# Patient Record
Sex: FEMALE | Race: BLACK OR AFRICAN AMERICAN | NOT HISPANIC OR LATINO | URBAN - METROPOLITAN AREA
[De-identification: names, ages, dates, MRNs, and addresses within clinical notes are randomized per-mention and may not be internally consistent; named-entity substitution may affect disease eponyms.]

---

## 2023-12-20 ENCOUNTER — OFFICE VISIT (OUTPATIENT)
Dept: PRIMARY CARE | Facility: CLINIC | Age: 48
End: 2023-12-20
Payer: COMMERCIAL

## 2023-12-20 DIAGNOSIS — E66.3 OVERWEIGHT: ICD-10-CM

## 2023-12-20 DIAGNOSIS — G43.009 MIGRAINE WITHOUT AURA AND WITHOUT STATUS MIGRAINOSUS, NOT INTRACTABLE: ICD-10-CM

## 2023-12-20 DIAGNOSIS — F41.9 ANXIETY: ICD-10-CM

## 2023-12-20 DIAGNOSIS — F33.1 MODERATE EPISODE OF RECURRENT MAJOR DEPRESSIVE DISORDER (MULTI): ICD-10-CM

## 2023-12-20 DIAGNOSIS — F51.01 PRIMARY INSOMNIA: ICD-10-CM

## 2023-12-20 DIAGNOSIS — B37.31 VAGINAL CANDIDIASIS: Primary | ICD-10-CM

## 2023-12-20 DIAGNOSIS — J40 BRONCHITIS: ICD-10-CM

## 2023-12-20 DIAGNOSIS — J01.40 ACUTE NON-RECURRENT PANSINUSITIS: ICD-10-CM

## 2023-12-20 PROCEDURE — 1036F TOBACCO NON-USER: CPT | Performed by: INTERNAL MEDICINE

## 2023-12-20 PROCEDURE — 3008F BODY MASS INDEX DOCD: CPT | Performed by: INTERNAL MEDICINE

## 2023-12-20 PROCEDURE — 99212 OFFICE O/P EST SF 10 MIN: CPT | Performed by: INTERNAL MEDICINE

## 2023-12-20 RX ORDER — ATOGEPANT 60 MG/1
60 TABLET ORAL DAILY
Qty: 100 TABLET | Refills: 1 | Status: SHIPPED | OUTPATIENT
Start: 2023-12-20 | End: 2024-05-31 | Stop reason: SDUPTHER

## 2023-12-20 RX ORDER — BUPROPION HYDROCHLORIDE 300 MG/1
300 TABLET ORAL DAILY
COMMUNITY
End: 2023-12-20 | Stop reason: SDUPTHER

## 2023-12-20 RX ORDER — ATOGEPANT 60 MG/1
60 TABLET ORAL DAILY
COMMUNITY
End: 2023-12-20 | Stop reason: SDUPTHER

## 2023-12-20 RX ORDER — LEVOFLOXACIN 500 MG/1
500 TABLET, FILM COATED ORAL DAILY
Qty: 10 TABLET | Refills: 0 | Status: SHIPPED | OUTPATIENT
Start: 2023-12-20 | End: 2023-12-30

## 2023-12-20 RX ORDER — ZOLPIDEM TARTRATE 10 MG/1
10 TABLET ORAL NIGHTLY
Qty: 30 TABLET | Refills: 2 | Status: SHIPPED | OUTPATIENT
Start: 2023-12-20 | End: 2024-04-01 | Stop reason: SDUPTHER

## 2023-12-20 RX ORDER — BUPROPION HYDROCHLORIDE 300 MG/1
300 TABLET ORAL DAILY
Qty: 100 TABLET | Refills: 1 | Status: SHIPPED | OUTPATIENT
Start: 2023-12-20 | End: 2024-05-31 | Stop reason: SDUPTHER

## 2023-12-20 RX ORDER — SERTRALINE HYDROCHLORIDE 100 MG/1
200 TABLET, FILM COATED ORAL DAILY
COMMUNITY
End: 2023-12-20 | Stop reason: SDUPTHER

## 2023-12-20 RX ORDER — PHENTERMINE HYDROCHLORIDE 37.5 MG/1
37.5 TABLET ORAL DAILY
COMMUNITY
End: 2023-12-21 | Stop reason: SDUPTHER

## 2023-12-20 RX ORDER — SERTRALINE HYDROCHLORIDE 100 MG/1
200 TABLET, FILM COATED ORAL DAILY
Qty: 200 TABLET | Refills: 1 | Status: SHIPPED | OUTPATIENT
Start: 2023-12-20 | End: 2024-05-31 | Stop reason: SDUPTHER

## 2023-12-20 RX ORDER — ZOLPIDEM TARTRATE 10 MG/1
10 TABLET ORAL NIGHTLY
COMMUNITY
End: 2023-12-20 | Stop reason: SDUPTHER

## 2023-12-20 RX ORDER — FLUCONAZOLE 200 MG/1
TABLET ORAL
Qty: 2 TABLET | Refills: 0 | Status: SHIPPED | OUTPATIENT
Start: 2023-12-20 | End: 2024-04-03 | Stop reason: WASHOUT

## 2023-12-20 NOTE — PROGRESS NOTES
"Primary Care Physician: Patrick Shearer MD    Date of Visit: 12/20/2023     Chief Complaint:   Chief Complaint   Patient presents with    Med Refill       Subjective:   Sri Lubin is a 48 y.o. female presents     HPI:  HPI  Pt is here for medication refill.   Hx of ins  Depression--increased stress.  Situation with mom and niece, daughter and niece.  She is helping 15 yr old twins who are now staying in her home.    Migraine ha.     C/o head congestion, postnasal gtt, cough, and ear pain for about a week.   No fever or chills.    Over wt.  Would like to restart phentermine.     Past Medical History:  History reviewed. No pertinent past medical history.     Social History:   reports that she has never smoked. She has never used smokeless tobacco.     Family History:  family history is not on file.      Allergies:  Allergies   Allergen Reactions    Darvocet A500 [Propoxyphene N-Acetaminophen] Itching    Morphine Itching     Makes Her Itch       Outpatient Medications:  Current Outpatient Medications   Medication Instructions    buPROPion XL (WELLBUTRIN XL) 300 mg, oral, Daily    fluconazole (Diflucan) 200 mg tablet 1 tab by mouth today.  May repeat in 48 hours x 1 dose.    levoFLOXacin (LEVAQUIN) 500 mg, oral, Daily    phentermine (ADIPEX-P) 37.5 mg, oral, Daily    Qulipta 60 mg, oral, Daily    sertraline (ZOLOFT) 200 mg, oral, Daily    zolpidem (AMBIEN) 10 mg, oral, Nightly         ROS:   Review of Systems   Constitutional: Negative.    HENT: Negative.     Eyes: Negative.    Respiratory: Negative.     Cardiovascular: Negative.    Gastrointestinal: Negative.    Endocrine: Negative.    Genitourinary: Negative.    Musculoskeletal: Negative.    Skin: Negative.    Allergic/Immunologic: Negative.    Neurological:  Positive for headaches.   Hematological: Negative.    Psychiatric/Behavioral:  Positive for sleep disturbance.         Vitals:  /74   Pulse 68   Ht 1.651 m (5' 5\")   Wt 78 kg (172 lb)   SpO2 98%   " BMI 28.62 kg/m²   Wt Readings from Last 2 Encounters:   12/20/23 78 kg (172 lb)       Physical Exam:  Physical Exam  Vitals reviewed.   Constitutional:       Appearance: Normal appearance.   HENT:      Head: Normocephalic and atraumatic.   Cardiovascular:      Rate and Rhythm: Normal rate and regular rhythm.      Heart sounds: Normal heart sounds, S1 normal and S2 normal. No murmur heard.  Pulmonary:      Effort: Pulmonary effort is normal.      Breath sounds: Normal breath sounds. No wheezing, rhonchi or rales.   Neurological:      Mental Status: She is alert and oriented to person, place, and time.            Assessment/Plan   Diagnoses and all orders for this visit:  Vaginal candidiasis  -     fluconazole (Diflucan) 200 mg tablet; 1 tab by mouth today.  May repeat in 48 hours x 1 dose.  Migraine without aura and without status migrainosus, not intractable  -     Qulipta 60 mg tablet tablet; Take 1 tablet (60 mg) by mouth once daily.  Primary insomnia  -     zolpidem (Ambien) 10 mg tablet; Take 1 tablet (10 mg) by mouth once daily at bedtime.  Bronchitis  -     levoFLOXacin (Levaquin) 500 mg tablet; Take 1 tablet (500 mg) by mouth once daily for 10 days.  Acute non-recurrent pansinusitis  -     levoFLOXacin (Levaquin) 500 mg tablet; Take 1 tablet (500 mg) by mouth once daily for 10 days.  Overweight  -     phentermine (Adipex-P) 37.5 mg tablet; Take 1 tablet (37.5 mg) by mouth once daily.  BMI 28.0-28.9,adult  -     phentermine (Adipex-P) 37.5 mg tablet; Take 1 tablet (37.5 mg) by mouth once daily.  Moderate episode of recurrent major depressive disorder (CMS/HCC)  -     buPROPion XL (Wellbutrin XL) 300 mg 24 hr tablet; Take 1 tablet (300 mg) by mouth once daily.  -     sertraline (Zoloft) 100 mg tablet; Take 2 tablets (200 mg) by mouth once daily.  Anxiety  -     buPROPion XL (Wellbutrin XL) 300 mg 24 hr tablet; Take 1 tablet (300 mg) by mouth once daily.  -     sertraline (Zoloft) 100 mg tablet; Take 2 tablets  (200 mg) by mouth once daily.        Orders:  No orders of the defined types were placed in this encounter.       Followup Appts:  No future appointments.     Self pay   ____________________________________________________________  Patrick Shearer MD

## 2023-12-21 VITALS
HEART RATE: 68 BPM | DIASTOLIC BLOOD PRESSURE: 74 MMHG | OXYGEN SATURATION: 98 % | WEIGHT: 172 LBS | HEIGHT: 65 IN | BODY MASS INDEX: 28.66 KG/M2 | SYSTOLIC BLOOD PRESSURE: 126 MMHG

## 2023-12-21 RX ORDER — PHENTERMINE HYDROCHLORIDE 37.5 MG/1
37.5 TABLET ORAL DAILY
Qty: 30 TABLET | Refills: 0 | Status: SHIPPED | OUTPATIENT
Start: 2023-12-21 | End: 2024-04-01 | Stop reason: SDUPTHER

## 2024-01-09 PROBLEM — J40 BRONCHITIS: Status: ACTIVE | Noted: 2024-01-09

## 2024-01-09 PROBLEM — E66.3 OVERWEIGHT: Status: ACTIVE | Noted: 2024-01-09

## 2024-01-09 ASSESSMENT — ENCOUNTER SYMPTOMS
EYES NEGATIVE: 1
GASTROINTESTINAL NEGATIVE: 1
MUSCULOSKELETAL NEGATIVE: 1
ALLERGIC/IMMUNOLOGIC NEGATIVE: 1
HEMATOLOGIC/LYMPHATIC NEGATIVE: 1
HEADACHES: 1
ENDOCRINE NEGATIVE: 1
RESPIRATORY NEGATIVE: 1
CARDIOVASCULAR NEGATIVE: 1
CONSTITUTIONAL NEGATIVE: 1
SLEEP DISTURBANCE: 1

## 2024-04-01 ENCOUNTER — OFFICE VISIT (OUTPATIENT)
Dept: PRIMARY CARE | Facility: CLINIC | Age: 49
End: 2024-04-01

## 2024-04-01 VITALS
WEIGHT: 179.8 LBS | OXYGEN SATURATION: 96 % | TEMPERATURE: 97.3 F | SYSTOLIC BLOOD PRESSURE: 118 MMHG | BODY MASS INDEX: 29.96 KG/M2 | HEART RATE: 108 BPM | DIASTOLIC BLOOD PRESSURE: 70 MMHG | HEIGHT: 65 IN

## 2024-04-01 DIAGNOSIS — F41.9 ANXIETY: ICD-10-CM

## 2024-04-01 DIAGNOSIS — L74.510 AXILLARY HYPERHIDROSIS: ICD-10-CM

## 2024-04-01 DIAGNOSIS — F33.1 MODERATE EPISODE OF RECURRENT MAJOR DEPRESSIVE DISORDER (MULTI): ICD-10-CM

## 2024-04-01 DIAGNOSIS — F51.01 PRIMARY INSOMNIA: Primary | ICD-10-CM

## 2024-04-01 DIAGNOSIS — E66.3 OVERWEIGHT: ICD-10-CM

## 2024-04-01 PROCEDURE — 3008F BODY MASS INDEX DOCD: CPT | Performed by: INTERNAL MEDICINE

## 2024-04-01 PROCEDURE — 99212 OFFICE O/P EST SF 10 MIN: CPT | Performed by: INTERNAL MEDICINE

## 2024-04-01 RX ORDER — ZOLPIDEM TARTRATE 10 MG/1
10 TABLET ORAL NIGHTLY
Qty: 30 TABLET | Refills: 2 | Status: SHIPPED | OUTPATIENT
Start: 2024-04-01 | End: 2024-05-31 | Stop reason: SDUPTHER

## 2024-04-01 RX ORDER — PHENTERMINE HYDROCHLORIDE 37.5 MG/1
37.5 TABLET ORAL DAILY
Qty: 30 TABLET | Refills: 0 | Status: SHIPPED | OUTPATIENT
Start: 2024-04-01 | End: 2024-05-31 | Stop reason: SDUPTHER

## 2024-04-01 NOTE — PROGRESS NOTES
"Primary Care Physician: Patrick Shearer MD    Date of Visit: 04/01/2024     Chief Complaint:   Chief Complaint   Patient presents with    Med Refill       Subjective:   Sri Lubin is a 48 y.o. female presents medication refills.      HPI:  HPI  Migraine ha qulipta is till effective   Insomnia--on ambien for yrs.  Continues to be effective in helping her fall asleep and maintain sleep  Depression.  Troubles with daughter again.  She has not spoken to her in 4 months.  States that this has been cz'g her anxiety as well.  Overwt.  She would like to trial phentemine again to help aid in wt loss.   C/o increased axillary sweating.  Has used drysol in the past.  Would like to trial again.   Past Medical History:  No past medical history on file.     Social History:   reports that she has never smoked. She has never used smokeless tobacco.     Family History:  family history is not on file.      Allergies:  Allergies   Allergen Reactions    Darvocet A500 [Propoxyphene N-Acetaminophen] Itching    Morphine Itching     Makes Her Itch       Outpatient Medications:  Current Outpatient Medications   Medication Instructions    buPROPion XL (WELLBUTRIN XL) 300 mg, oral, Daily    phentermine (ADIPEX-P) 37.5 mg, oral, Daily    Qulipta 60 mg, oral, Daily    sertraline (ZOLOFT) 200 mg, oral, Daily    zolpidem (AMBIEN) 10 mg, oral, Nightly         ROS:   Review of Systems     Vitals:  /70   Pulse 108   Temp 36.3 °C (97.3 °F)   Ht 1.651 m (5' 5\")   Wt 81.6 kg (179 lb 12.8 oz)   SpO2 96%   BMI 29.92 kg/m²   Wt Readings from Last 2 Encounters:   04/01/24 81.6 kg (179 lb 12.8 oz)   12/20/23 78 kg (172 lb)       Physical Exam:  Physical Exam          Assessment/Plan   Diagnoses and all orders for this visit:  Primary insomnia  -     zolpidem (Ambien) 10 mg tablet; Take 1 tablet (10 mg) by mouth once daily at bedtime.  Overweight  -     phentermine (Adipex-P) 37.5 mg tablet; Take 1 tablet (37.5 mg) by mouth once daily.  BMI " 28.0-28.9,adult  -     phentermine (Adipex-P) 37.5 mg tablet; Take 1 tablet (37.5 mg) by mouth once daily.  Moderate episode of recurrent major depressive disorder (CMS/HCC)  Anxiety        Orders:  No orders of the defined types were placed in this encounter.       Followup Appts:  No future appointments.     Self pay     ____________________________________________________________  Patrick Shearer MD

## 2024-04-03 RX ORDER — ALUMINUM CHLORIDE 20 %
SOLUTION, NON-ORAL TOPICAL NIGHTLY
Qty: 60 ML | Refills: 1 | Status: SHIPPED | OUTPATIENT
Start: 2024-04-03

## 2024-04-04 ENCOUNTER — TELEPHONE (OUTPATIENT)
Dept: PRIMARY CARE | Facility: CLINIC | Age: 49
End: 2024-04-04
Payer: COMMERCIAL

## 2024-05-30 ENCOUNTER — OFFICE VISIT (OUTPATIENT)
Dept: PRIMARY CARE | Facility: CLINIC | Age: 49
End: 2024-05-30
Payer: COMMERCIAL

## 2024-05-30 VITALS
SYSTOLIC BLOOD PRESSURE: 126 MMHG | DIASTOLIC BLOOD PRESSURE: 72 MMHG | WEIGHT: 171 LBS | BODY MASS INDEX: 27.48 KG/M2 | HEIGHT: 66 IN | OXYGEN SATURATION: 97 % | HEART RATE: 93 BPM

## 2024-05-30 DIAGNOSIS — F33.1 MODERATE EPISODE OF RECURRENT MAJOR DEPRESSIVE DISORDER (MULTI): ICD-10-CM

## 2024-05-30 DIAGNOSIS — F41.9 ANXIETY: ICD-10-CM

## 2024-05-30 DIAGNOSIS — F51.01 PRIMARY INSOMNIA: ICD-10-CM

## 2024-05-30 DIAGNOSIS — E66.3 OVERWEIGHT: ICD-10-CM

## 2024-05-30 DIAGNOSIS — G43.009 MIGRAINE WITHOUT AURA AND WITHOUT STATUS MIGRAINOSUS, NOT INTRACTABLE: ICD-10-CM

## 2024-05-30 PROCEDURE — 99212 OFFICE O/P EST SF 10 MIN: CPT | Performed by: INTERNAL MEDICINE

## 2024-05-30 PROCEDURE — 3008F BODY MASS INDEX DOCD: CPT | Performed by: INTERNAL MEDICINE

## 2024-05-31 RX ORDER — SERTRALINE HYDROCHLORIDE 100 MG/1
200 TABLET, FILM COATED ORAL DAILY
Qty: 200 TABLET | Refills: 1 | Status: SHIPPED | OUTPATIENT
Start: 2024-05-31 | End: 2024-12-17

## 2024-05-31 RX ORDER — PHENTERMINE HYDROCHLORIDE 37.5 MG/1
37.5 TABLET ORAL DAILY
Qty: 30 TABLET | Refills: 0 | Status: SHIPPED | OUTPATIENT
Start: 2024-05-31 | End: 2024-06-30

## 2024-05-31 RX ORDER — BUPROPION HYDROCHLORIDE 300 MG/1
300 TABLET ORAL DAILY
Qty: 100 TABLET | Refills: 1 | Status: SHIPPED | OUTPATIENT
Start: 2024-05-31 | End: 2024-12-17

## 2024-05-31 RX ORDER — ZOLPIDEM TARTRATE 10 MG/1
10 TABLET ORAL NIGHTLY
Qty: 30 TABLET | Refills: 2 | Status: SHIPPED | OUTPATIENT
Start: 2024-05-31 | End: 2024-08-29

## 2024-05-31 RX ORDER — ATOGEPANT 60 MG/1
60 TABLET ORAL DAILY
Qty: 100 TABLET | Refills: 1 | Status: SHIPPED | OUTPATIENT
Start: 2024-05-31 | End: 2024-12-17

## 2024-05-31 NOTE — PROGRESS NOTES
"Primary Care Physician: Patrick Shearer MD    Date of Visit: 05/30/2024     Chief Complaint:   Chief Complaint   Patient presents with    Med Refill       Subjective:   Sri Lubin is a 48 y.o. female presents medication refills.    HPI:  HPI  Pt is here for medication refills.   States that overall, she has been well    Migraine ha  Insomnia  Depression.     All medications continue to be effective.       Overwt.  Would like to continue with phentermine to help aid in wt loss.     Past Medical History:  No past medical history on file.     Social History:   reports that she has never smoked. She has never used smokeless tobacco.     Family History:  family history is not on file.      Allergies:  Allergies   Allergen Reactions    Darvocet A500 [Propoxyphene N-Acetaminophen] Itching    Morphine Itching     Makes Her Itch       Outpatient Medications:  Current Outpatient Medications   Medication Instructions    aluminum chloride (Drysol Dab-O-Matic) 20 % external solution Topical, Nightly, Once excessive sweating has stopped, may decrease to once or twice weekly.    buPROPion XL (WELLBUTRIN XL) 300 mg, oral, Daily    phentermine (ADIPEX-P) 37.5 mg, oral, Daily    Qulipta 60 mg, oral, Daily    sertraline (ZOLOFT) 200 mg, oral, Daily    zolpidem (AMBIEN) 10 mg, oral, Nightly         ROS:   Review of Systems     Vitals:  /72 (BP Location: Right arm, Patient Position: Sitting, BP Cuff Size: Adult)   Pulse 93   Ht 1.664 m (5' 5.5\")   Wt 77.6 kg (171 lb)   SpO2 97%   BMI 28.02 kg/m²   Wt Readings from Last 2 Encounters:   05/30/24 77.6 kg (171 lb)   04/01/24 81.6 kg (179 lb 12.8 oz)       Physical Exam:  Physical Exam  Vitals reviewed.   Constitutional:       Appearance: Normal appearance.   HENT:      Head: Normocephalic and atraumatic.   Cardiovascular:      Rate and Rhythm: Normal rate and regular rhythm.      Heart sounds: Normal heart sounds, S1 normal and S2 normal. No murmur heard.  Pulmonary:      " Effort: Pulmonary effort is normal.      Breath sounds: Normal breath sounds. No wheezing, rhonchi or rales.   Abdominal:      General: Bowel sounds are normal.      Palpations: Abdomen is soft.   Neurological:      Mental Status: She is alert and oriented to person, place, and time.               Assessment/Plan         Orders:  No orders of the defined types were placed in this encounter.       Followup Appts:  No future appointments.        ____________________________________________________________  Patrick Shearer MD
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
## 2024-09-26 ENCOUNTER — APPOINTMENT (OUTPATIENT)
Dept: PRIMARY CARE | Facility: CLINIC | Age: 49
End: 2024-09-26

## 2024-10-10 ENCOUNTER — APPOINTMENT (OUTPATIENT)
Dept: PRIMARY CARE | Facility: CLINIC | Age: 49
End: 2024-10-10

## 2024-10-10 DIAGNOSIS — F51.01 PRIMARY INSOMNIA: ICD-10-CM

## 2024-10-10 DIAGNOSIS — E66.3 OVERWEIGHT: ICD-10-CM

## 2024-10-10 DIAGNOSIS — F41.9 ANXIETY: ICD-10-CM

## 2024-10-10 DIAGNOSIS — G43.009 MIGRAINE WITHOUT AURA AND WITHOUT STATUS MIGRAINOSUS, NOT INTRACTABLE: ICD-10-CM

## 2024-10-10 DIAGNOSIS — F33.1 MODERATE EPISODE OF RECURRENT MAJOR DEPRESSIVE DISORDER: ICD-10-CM

## 2024-10-10 DIAGNOSIS — R23.2 HOT FLASHES: ICD-10-CM

## 2024-10-10 DIAGNOSIS — R05.1 ACUTE COUGH: Primary | ICD-10-CM

## 2024-10-10 PROCEDURE — 99213 OFFICE O/P EST LOW 20 MIN: CPT | Performed by: INTERNAL MEDICINE

## 2024-10-10 RX ORDER — SERTRALINE HYDROCHLORIDE 100 MG/1
200 TABLET, FILM COATED ORAL DAILY
Qty: 200 TABLET | Refills: 1 | Status: SHIPPED | OUTPATIENT
Start: 2024-10-10 | End: 2025-04-28

## 2024-10-10 RX ORDER — BUPROPION HYDROCHLORIDE 300 MG/1
300 TABLET ORAL DAILY
Qty: 100 TABLET | Refills: 1 | Status: SHIPPED | OUTPATIENT
Start: 2024-10-10 | End: 2025-04-28

## 2024-10-10 RX ORDER — AZITHROMYCIN 250 MG/1
TABLET, FILM COATED ORAL
Qty: 6 TABLET | Refills: 0 | Status: SHIPPED | OUTPATIENT
Start: 2024-10-10 | End: 2024-10-15

## 2024-10-10 RX ORDER — ATOGEPANT 60 MG/1
60 TABLET ORAL DAILY
Qty: 100 TABLET | Refills: 1 | Status: SHIPPED | OUTPATIENT
Start: 2024-10-10 | End: 2025-04-28

## 2024-10-10 RX ORDER — ZOLPIDEM TARTRATE 10 MG/1
10 TABLET ORAL NIGHTLY
Qty: 30 TABLET | Refills: 2 | Status: SHIPPED | OUTPATIENT
Start: 2024-10-10 | End: 2025-01-08
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
## 2024-10-11 ENCOUNTER — TELEPHONE (OUTPATIENT)
Dept: PRIMARY CARE | Facility: CLINIC | Age: 49
End: 2024-10-11

## 2024-10-11 RX ORDER — FEZOLINETANT 45 MG/1
45 TABLET, FILM COATED ORAL DAILY
Qty: 30 TABLET | Refills: 2 | Status: SHIPPED | OUTPATIENT
Start: 2024-10-11

## 2024-10-11 NOTE — TELEPHONE ENCOUNTER
Pt called about med (veozah) informed that we have prescription cards/samples avail in office (suite b, closet)  Will be by on Monday to  both

## 2024-10-16 DIAGNOSIS — E66.3 OVERWEIGHT: ICD-10-CM

## 2024-10-16 RX ORDER — PHENTERMINE HYDROCHLORIDE 37.5 MG/1
37.5 TABLET ORAL DAILY
Qty: 30 TABLET | Refills: 0 | Status: CANCELLED | OUTPATIENT
Start: 2024-10-16 | End: 2024-11-15

## 2024-10-23 RX ORDER — PHENTERMINE HYDROCHLORIDE 37.5 MG/1
37.5 TABLET ORAL DAILY
Qty: 30 TABLET | Refills: 0 | Status: SHIPPED | OUTPATIENT
Start: 2024-10-23 | End: 2024-11-22

## 2024-10-25 ENCOUNTER — TELEPHONE (OUTPATIENT)
Dept: PRIMARY CARE | Facility: CLINIC | Age: 49
End: 2024-10-25

## 2024-10-25 NOTE — TELEPHONE ENCOUNTER
Patient states the samples of Veozah worked great!!!  No hot flashs.  Aware it was denied by insurance and asking if you need try to other meds on the denial?  I told her about Veozah coupon - she will come

## 2024-10-29 NOTE — TELEPHONE ENCOUNTER
Pt called back and the discount card did not work at your pharmacy. Told her she had to meet her OOP first.   Was wondering if she was able to try something else.     She takes last veozah pill tomorrow.

## 2024-11-22 ENCOUNTER — TELEPHONE (OUTPATIENT)
Dept: PRIMARY CARE | Facility: CLINIC | Age: 49
End: 2024-11-22

## 2024-11-22 NOTE — TELEPHONE ENCOUNTER
patients veozah not covered needs to try any of the following- estradiol, premarin, prempro, premphase. can you send one of these to  for her?

## 2024-11-25 NOTE — TELEPHONE ENCOUNTER
Per dr alvarado:  no, I do not recommend hormone replacement.       Can you please let the patient know? Thanks!

## 2025-01-09 ENCOUNTER — HOSPITAL ENCOUNTER (OUTPATIENT)
Dept: RADIOLOGY | Facility: EXTERNAL LOCATION | Age: 50
Discharge: HOME | End: 2025-01-09

## 2025-01-09 DIAGNOSIS — R92.2 INCONCLUSIVE MAMMOGRAM: Primary | ICD-10-CM

## 2025-01-09 DIAGNOSIS — R92.8 OTH ABN AND INCONCLUSIVE FINDINGS ON DX IMAGING OF BREAST: ICD-10-CM

## 2025-01-09 DIAGNOSIS — R92.2 INCONCLUSIVE MAMMOGRAM: ICD-10-CM

## 2025-01-10 ENCOUNTER — TELEPHONE (OUTPATIENT)
Dept: PRIMARY CARE | Facility: CLINIC | Age: 50
End: 2025-01-10

## 2025-01-10 NOTE — TELEPHONE ENCOUNTER
LVMTCB    PER     Mammogram Results inconclusive mammogram she ordered [facility] Mammo left diagnostic tomosynthesis [facility]

## 2025-01-16 ENCOUNTER — APPOINTMENT (OUTPATIENT)
Dept: PRIMARY CARE | Facility: CLINIC | Age: 50
End: 2025-01-16

## 2025-01-16 DIAGNOSIS — G43.009 MIGRAINE WITHOUT AURA AND WITHOUT STATUS MIGRAINOSUS, NOT INTRACTABLE: ICD-10-CM

## 2025-01-16 DIAGNOSIS — E66.3 OVERWEIGHT: ICD-10-CM

## 2025-01-16 DIAGNOSIS — F33.1 MODERATE EPISODE OF RECURRENT MAJOR DEPRESSIVE DISORDER: ICD-10-CM

## 2025-01-16 DIAGNOSIS — F51.01 PRIMARY INSOMNIA: Primary | ICD-10-CM

## 2025-01-16 DIAGNOSIS — B00.1 HERPESVIRAL VESICULAR DERMATITIS: ICD-10-CM

## 2025-01-16 DIAGNOSIS — F41.9 ANXIETY: ICD-10-CM

## 2025-01-16 PROCEDURE — 99213 OFFICE O/P EST LOW 20 MIN: CPT | Performed by: INTERNAL MEDICINE

## 2025-01-16 RX ORDER — SERTRALINE HYDROCHLORIDE 100 MG/1
200 TABLET, FILM COATED ORAL DAILY
Qty: 200 TABLET | Refills: 1 | Status: SHIPPED | OUTPATIENT
Start: 2025-01-16 | End: 2025-08-04

## 2025-01-16 RX ORDER — ZOLPIDEM TARTRATE 10 MG/1
10 TABLET ORAL NIGHTLY
Qty: 30 TABLET | Refills: 2 | Status: SHIPPED | OUTPATIENT
Start: 2025-01-16 | End: 2025-04-16

## 2025-01-16 RX ORDER — ATOGEPANT 60 MG/1
60 TABLET ORAL DAILY
Qty: 100 TABLET | Refills: 1 | Status: SHIPPED | OUTPATIENT
Start: 2025-01-16 | End: 2025-08-04

## 2025-01-16 RX ORDER — BUPROPION HYDROCHLORIDE 300 MG/1
300 TABLET ORAL DAILY
Qty: 100 TABLET | Refills: 1 | Status: SHIPPED | OUTPATIENT
Start: 2025-01-16 | End: 2025-08-04

## 2025-01-16 RX ORDER — PHENTERMINE HYDROCHLORIDE 37.5 MG/1
37.5 TABLET ORAL DAILY
Qty: 30 TABLET | Refills: 2 | Status: SHIPPED | OUTPATIENT
Start: 2025-01-16

## 2025-01-16 RX ORDER — VALACYCLOVIR HYDROCHLORIDE 1 G/1
1000 TABLET, FILM COATED ORAL 2 TIMES DAILY
Qty: 6 TABLET | Refills: 5 | Status: SHIPPED | OUTPATIENT
Start: 2025-01-16

## 2025-01-16 NOTE — PROGRESS NOTES
Primary Care Physician: Patrick Shearer MD    Date of Visit: 01/16/2025   Virtual or Telephone Consent    An interactive audio and video telecommunication system which permits real time communications between the patient (at the originating site) and provider (at the distant site) was utilized to provide this telehealth service.   Verbal consent was requested and obtained from Sri Lubin on this date, 01/16/25 for a telehealth visit.     Chief Complaint:   No chief complaint on file.        HPI:  HPI    Subjective:   Sri Lubin is a 49 y.o. female presents   Med [facility]  Insomnia.   She has been on ambien for several yrs.  It remains effective in helping her get to sleep and stay asleep.    Migraine ha--qulipta is working well  Overwt.  She would like a refill of phentermine.     Recently had cold sore.  Would took a while to resolve.  Recommended valtrex.  She would like to try.     Allergies:  Allergies   Allergen Reactions    Darvocet A500 [Propoxyphene N-Acetaminophen] Itching    Morphine Itching     Makes Her Itch       Outpatient Medications:  Current Outpatient Medications   Medication Instructions    aluminum chloride (Drysol Dab-O-Matic) 20 % external solution Topical, Nightly, Once excessive sweating has stopped, may decrease to once or twice weekly.    buPROPion XL (WELLBUTRIN XL) 300 mg, oral, Daily    phentermine (ADIPEX-P) 37.5 mg, oral, Daily    Qulipta 60 mg, oral, Daily    sertraline (ZOLOFT) 200 mg, oral, Daily    Veozah 45 mg, oral, Daily    zolpidem (AMBIEN) 10 mg, oral, Nightly       ROS:   Review of Systems     Vitals:  There were no vitals taken for this visit.  Wt Readings from Last 3 Encounters:   05/30/24 77.6 kg (171 lb)   04/01/24 81.6 kg (179 lb 12.8 oz)   12/20/23 78 kg (172 lb)     BP Readings from Last 3 Encounters:   05/30/24 126/72   04/01/24 118/70   12/20/23 126/74        Physical Exam:  Physical Exam     No exam.  Telehealth.     Assessment/Plan   Diagnoses and all orders  for this visit:  Primary insomnia  -     zolpidem (Ambien) 10 mg tablet; Take 1 tablet (10 mg) by mouth once daily at bedtime.  Migraine without aura and without status migrainosus, not intractable  -     atogepant (Qulipta) 60 mg tablet tablet; Take 1 tablet (60 mg) by mouth once daily.  Moderate episode of recurrent major depressive disorder  -     buPROPion XL (Wellbutrin XL) 300 mg 24 hr tablet; Take 1 tablet (300 mg) by mouth once daily.  -     sertraline (Zoloft) 100 mg tablet; Take 2 tablets (200 mg) by mouth once daily.  Anxiety  -     buPROPion XL (Wellbutrin XL) 300 mg 24 hr tablet; Take 1 tablet (300 mg) by mouth once daily.  -     sertraline (Zoloft) 100 mg tablet; Take 2 tablets (200 mg) by mouth once daily.  Overweight  -     phentermine (Adipex-P) 37.5 mg tablet; Take 1 tablet (37.5 mg) by mouth once daily.  BMI 28.0-28.9,adult  -     phentermine (Adipex-P) 37.5 mg tablet; Take 1 tablet (37.5 mg) by mouth once daily.  Herpesviral vesicular dermatitis  -     valACYclovir (Valtrex) 1 gram tablet; Take 1 tablet (1,000 mg) by mouth 2 times a day.      Orders:  No orders of the defined types were placed in this encounter.       Followup Appts:  No future appointments.        ____________________________________________________________  Patrick Shearer MD

## 2025-03-17 DIAGNOSIS — R92.8 OTH ABN AND INCONCLUSIVE FINDINGS ON DX IMAGING OF BREAST: ICD-10-CM

## 2025-03-17 DIAGNOSIS — R92.2 INCONCLUSIVE MAMMOGRAM: Primary | ICD-10-CM

## 2025-04-17 ENCOUNTER — APPOINTMENT (OUTPATIENT)
Dept: PRIMARY CARE | Facility: CLINIC | Age: 50
End: 2025-04-17

## 2025-04-17 VITALS
BODY MASS INDEX: 31.57 KG/M2 | SYSTOLIC BLOOD PRESSURE: 128 MMHG | RESPIRATION RATE: 18 BRPM | TEMPERATURE: 97.2 F | DIASTOLIC BLOOD PRESSURE: 70 MMHG | WEIGHT: 196.4 LBS | OXYGEN SATURATION: 99 % | HEIGHT: 66 IN | HEART RATE: 90 BPM

## 2025-04-17 DIAGNOSIS — E66.811 CLASS 1 OBESITY DUE TO EXCESS CALORIES WITHOUT SERIOUS COMORBIDITY WITH BODY MASS INDEX (BMI) OF 32.0 TO 32.9 IN ADULT: ICD-10-CM

## 2025-04-17 DIAGNOSIS — F41.9 ANXIETY: ICD-10-CM

## 2025-04-17 DIAGNOSIS — E66.09 CLASS 1 OBESITY DUE TO EXCESS CALORIES WITHOUT SERIOUS COMORBIDITY WITH BODY MASS INDEX (BMI) OF 32.0 TO 32.9 IN ADULT: ICD-10-CM

## 2025-04-17 DIAGNOSIS — F51.01 PRIMARY INSOMNIA: Primary | ICD-10-CM

## 2025-04-17 PROCEDURE — 99213 OFFICE O/P EST LOW 20 MIN: CPT | Performed by: INTERNAL MEDICINE

## 2025-04-17 PROCEDURE — 3008F BODY MASS INDEX DOCD: CPT | Performed by: INTERNAL MEDICINE

## 2025-04-17 ASSESSMENT — PATIENT HEALTH QUESTIONNAIRE - PHQ9
SUM OF ALL RESPONSES TO PHQ9 QUESTIONS 1 AND 2: 2
2. FEELING DOWN, DEPRESSED OR HOPELESS: SEVERAL DAYS
1. LITTLE INTEREST OR PLEASURE IN DOING THINGS: SEVERAL DAYS

## 2025-04-19 RX ORDER — BUSPIRONE HYDROCHLORIDE 15 MG/1
15 TABLET ORAL 2 TIMES DAILY PRN
Qty: 60 TABLET | Refills: 1 | Status: SHIPPED | OUTPATIENT
Start: 2025-04-19

## 2025-04-19 RX ORDER — PHENTERMINE HYDROCHLORIDE 37.5 MG/1
37.5 TABLET ORAL DAILY
Qty: 30 TABLET | Refills: 2 | Status: SHIPPED | OUTPATIENT
Start: 2025-04-19

## 2025-04-19 RX ORDER — ZOLPIDEM TARTRATE 10 MG/1
10 TABLET ORAL NIGHTLY
Qty: 30 TABLET | Refills: 2 | Status: SHIPPED | OUTPATIENT
Start: 2025-04-19 | End: 2025-07-18
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
## 2025-04-20 PROBLEM — E66.09 CLASS 1 OBESITY DUE TO EXCESS CALORIES WITHOUT SERIOUS COMORBIDITY WITH BODY MASS INDEX (BMI) OF 32.0 TO 32.9 IN ADULT: Status: ACTIVE | Noted: 2025-04-20

## 2025-04-20 PROBLEM — E66.811 CLASS 1 OBESITY DUE TO EXCESS CALORIES WITHOUT SERIOUS COMORBIDITY WITH BODY MASS INDEX (BMI) OF 32.0 TO 32.9 IN ADULT: Status: ACTIVE | Noted: 2025-04-20

## 2025-04-20 ASSESSMENT — ENCOUNTER SYMPTOMS: NERVOUS/ANXIOUS: 1

## 2025-08-12 ENCOUNTER — APPOINTMENT (OUTPATIENT)
Dept: PRIMARY CARE | Facility: CLINIC | Age: 50
End: 2025-08-12

## 2025-08-12 DIAGNOSIS — L74.510 AXILLARY HYPERHIDROSIS: ICD-10-CM

## 2025-08-19 RX ORDER — ALUMINUM CHLORIDE 20 %
SOLUTION, NON-ORAL TOPICAL NIGHTLY
Qty: 60 ML | Refills: 0 | Status: SHIPPED | OUTPATIENT
Start: 2025-08-19